# Patient Record
Sex: MALE | Race: BLACK OR AFRICAN AMERICAN | NOT HISPANIC OR LATINO
[De-identification: names, ages, dates, MRNs, and addresses within clinical notes are randomized per-mention and may not be internally consistent; named-entity substitution may affect disease eponyms.]

---

## 2023-05-08 ENCOUNTER — APPOINTMENT (OUTPATIENT)
Dept: UROLOGY | Facility: CLINIC | Age: 43
End: 2023-05-08

## 2023-05-08 PROBLEM — Z00.00 ENCOUNTER FOR PREVENTIVE HEALTH EXAMINATION: Status: ACTIVE | Noted: 2023-05-08

## 2024-09-22 ENCOUNTER — NON-APPOINTMENT (OUTPATIENT)
Age: 44
End: 2024-09-22

## 2024-09-23 ENCOUNTER — APPOINTMENT (OUTPATIENT)
Age: 44
End: 2024-09-23
Payer: COMMERCIAL

## 2024-09-23 VITALS
DIASTOLIC BLOOD PRESSURE: 87 MMHG | SYSTOLIC BLOOD PRESSURE: 132 MMHG | HEART RATE: 72 BPM | OXYGEN SATURATION: 98 % | HEIGHT: 73 IN | BODY MASS INDEX: 27.83 KG/M2 | WEIGHT: 210 LBS

## 2024-09-23 DIAGNOSIS — M23.300 OTHER MENISCUS DERANGEMENTS, UNSPECIFIED LATERAL MENISCUS, RIGHT KNEE: ICD-10-CM

## 2024-09-23 DIAGNOSIS — S80.01XA CONTUSION OF RIGHT KNEE, INITIAL ENCOUNTER: ICD-10-CM

## 2024-09-23 PROCEDURE — 99203 OFFICE O/P NEW LOW 30 MIN: CPT | Mod: 25

## 2024-09-23 PROCEDURE — 73564 X-RAY EXAM KNEE 4 OR MORE: CPT | Mod: RT

## 2024-09-23 PROCEDURE — 20611 DRAIN/INJ JOINT/BURSA W/US: CPT | Mod: RT

## 2024-09-23 RX ORDER — DICLOFENAC SODIUM 50 MG/1
50 TABLET, DELAYED RELEASE ORAL
Qty: 28 | Refills: 1 | Status: ACTIVE | COMMUNITY
Start: 2024-09-23 | End: 1900-01-01

## 2024-09-23 NOTE — DISCUSSION/SUMMARY
[de-identified] : ZULEMA KAY is a 43 year old male presenting with 5-week history of right-sided knee pain after twisting falling down the stairs and hitting his knee consistent with lateral meniscus injury and knee contusion.  Discussed risk benefits alternatives to conservative treatment injections and surgical consultation.  Overall recommend the followin.  Right knee CSI performed today as above 2.  Home exercise program given.  Patient declined formal PT 3.  Diclofenac 50 mg prescribed 4.  Patient will follow-up in 6 weeks if no significant improvement or mechanical symptoms occur will refer for MRI

## 2024-09-23 NOTE — PHYSICAL EXAM
[de-identified] : Knee (right)   Inspection  Skin: normal  Effusion: Mild Bursa swelling: none   Palpation  Tenderness: Mild-moderate Location: Lateral joint line, proximal tibia  Crepitus: none.  Defect: none.  Popliteal fullness: negative.   Flexion  Active ROM: Limited by 10 degrees, painful Passive ROM: normal    Extension  Normal     Straight Leg Raise- can perform  Motor strength  Flexion: 5/5  Extension: 5/5   Sensory index  Normal.   ACL/PCL tests  Lachman test: stable  Anterior drawer: stable  Posterior drawer: stable   MCL/LCL tests  MCL laxity: stable  LCL laxity: stable   Patellofemoral tests  Patellar grind test: negative  Patellar apprehension: negative   Meniscal tests  Pratik's test: Positive laterally Thessalys: Positive laterally [de-identified] : XR of right knee Date: 9/23/2024   Views: 4 views Performed at [facility]: Yes Impression: Mild tricompartmental osteoarthritis.  Prominent enthesophytes noted at the quadriceps and patellar tendons.  These images were personally reviewed with original findings documented as above.

## 2024-09-23 NOTE — PROCEDURE
[de-identified] : Ultrasound Guided Injection   Indication: Ensure placement within the intra-articular knee joint utilizing the AVIcode portable ultrasound machine, the Linear 25mm 15-4 MHz transducer, sterile ultrasound gel, ultrasound guidance with the probe in short axis to the joint , utilizing an in-plane approach, was used for the following injection:    Injection: RIGHT intra-articular knee joint.  Indication: Knee osteoarthritis.  A discussion was had with the patient regarding this procedure and all questions were answered. All risks, benefits and alternatives were discussed. These included but were not limited to bleeding, infection, and allergic reaction. A timeout was performed prior to the procedure to ensure proper side.  Chlorhexidine was used to sterilize the skin overlying the knee joint.  A 21-gauge needle was used to inject 1cc of 0.5% Ropivacaine, 1cc 1% Lidocaine, 1cc of 40mg/mL Depo-Medrol into the joint from a superolateral approach. A sterile bandage was then applied. The patient tolerated the procedure well and there were no complications.

## 2024-09-23 NOTE — HISTORY OF PRESENT ILLNESS
[de-identified] : ZULEMA KAY is a 43 year old male presenting with 5-week history of right-sided knee pain.  [place] on a bottle twisted the knee and hit the knee on the side rail.  Since then he says it has been painful to stand out and walk.  Had not seen the doctor up until this point.  Says he tried home remedies ice heat elevation compression with minimal relief and the pain is not significantly improved.  The pain is mostly in the lateral aspect of the knee.  He said there was initial swelling with some residual swelling.  Here today for further evaluation.

## 2024-12-02 ENCOUNTER — APPOINTMENT (OUTPATIENT)
Age: 44
End: 2024-12-02
Payer: COMMERCIAL

## 2024-12-02 VITALS
DIASTOLIC BLOOD PRESSURE: 83 MMHG | BODY MASS INDEX: 27.83 KG/M2 | SYSTOLIC BLOOD PRESSURE: 126 MMHG | HEART RATE: 74 BPM | WEIGHT: 210 LBS | OXYGEN SATURATION: 99 % | HEIGHT: 73 IN

## 2024-12-02 DIAGNOSIS — M23.300 OTHER MENISCUS DERANGEMENTS, UNSPECIFIED LATERAL MENISCUS, RIGHT KNEE: ICD-10-CM

## 2024-12-02 DIAGNOSIS — M23.91 UNSPECIFIED INTERNAL DERANGEMENT OF RIGHT KNEE: ICD-10-CM

## 2024-12-02 PROCEDURE — 99214 OFFICE O/P EST MOD 30 MIN: CPT

## 2024-12-07 ENCOUNTER — NON-APPOINTMENT (OUTPATIENT)
Age: 44
End: 2024-12-07

## 2024-12-07 ENCOUNTER — APPOINTMENT (OUTPATIENT)
Dept: MRI IMAGING | Facility: CLINIC | Age: 44
End: 2024-12-07
Payer: COMMERCIAL

## 2024-12-07 PROCEDURE — 73721 MRI JNT OF LWR EXTRE W/O DYE: CPT | Mod: RT

## 2024-12-10 ENCOUNTER — APPOINTMENT (OUTPATIENT)
Age: 44
End: 2024-12-10
Payer: COMMERCIAL

## 2024-12-10 VITALS
BODY MASS INDEX: 27.83 KG/M2 | HEART RATE: 69 BPM | WEIGHT: 210 LBS | DIASTOLIC BLOOD PRESSURE: 90 MMHG | HEIGHT: 73 IN | SYSTOLIC BLOOD PRESSURE: 151 MMHG | OXYGEN SATURATION: 97 %

## 2024-12-10 DIAGNOSIS — S80.01XA CONTUSION OF RIGHT KNEE, INITIAL ENCOUNTER: ICD-10-CM

## 2024-12-10 DIAGNOSIS — M22.41 CHONDROMALACIA PATELLAE, RIGHT KNEE: ICD-10-CM

## 2024-12-10 PROCEDURE — 99214 OFFICE O/P EST MOD 30 MIN: CPT

## 2025-02-11 ENCOUNTER — APPOINTMENT (OUTPATIENT)
Age: 45
End: 2025-02-11
Payer: COMMERCIAL

## 2025-02-11 VITALS
DIASTOLIC BLOOD PRESSURE: 95 MMHG | WEIGHT: 210 LBS | OXYGEN SATURATION: 98 % | HEART RATE: 88 BPM | BODY MASS INDEX: 27.83 KG/M2 | SYSTOLIC BLOOD PRESSURE: 144 MMHG | HEIGHT: 73 IN

## 2025-02-11 DIAGNOSIS — M23.300 OTHER MENISCUS DERANGEMENTS, UNSPECIFIED LATERAL MENISCUS, RIGHT KNEE: ICD-10-CM

## 2025-02-11 DIAGNOSIS — M22.41 CHONDROMALACIA PATELLAE, RIGHT KNEE: ICD-10-CM

## 2025-02-11 PROCEDURE — 20611 DRAIN/INJ JOINT/BURSA W/US: CPT | Mod: RT

## 2025-02-11 PROCEDURE — 99214 OFFICE O/P EST MOD 30 MIN: CPT | Mod: 25
